# Patient Record
Sex: MALE | Race: WHITE | NOT HISPANIC OR LATINO | Employment: OTHER | URBAN - METROPOLITAN AREA
[De-identification: names, ages, dates, MRNs, and addresses within clinical notes are randomized per-mention and may not be internally consistent; named-entity substitution may affect disease eponyms.]

---

## 2022-11-29 ENCOUNTER — TELEPHONE (OUTPATIENT)
Dept: OTOLARYNGOLOGY | Facility: CLINIC | Age: 77
End: 2022-11-29
Payer: MEDICARE

## 2022-12-05 NOTE — TELEPHONE ENCOUNTER
12/2- Lilly spoke with pt and pt states Dr LUCIANA Galvez is ordering the U/S of thyroid to be done at [facility], and pt will call us once appt is scheduled.  KASSIE

## 2022-12-21 ENCOUNTER — TELEPHONE (OUTPATIENT)
Dept: OTOLARYNGOLOGY | Facility: CLINIC | Age: 77
End: 2022-12-21
Payer: MEDICARE

## 2022-12-21 DIAGNOSIS — E04.1 THYROID NODULE: Primary | ICD-10-CM

## 2022-12-21 NOTE — TELEPHONE ENCOUNTER
Ordered ultrasound of thyroid and called pt to inform him of his u/s on 12/27/22 @ [id number]. MAURIZIOj

## 2023-01-17 ENCOUNTER — OFFICE VISIT (OUTPATIENT)
Dept: OTOLARYNGOLOGY | Facility: CLINIC | Age: 78
End: 2023-01-17
Payer: MEDICARE

## 2023-01-17 VITALS
HEIGHT: 69 IN | SYSTOLIC BLOOD PRESSURE: 188 MMHG | BODY MASS INDEX: 32.44 KG/M2 | WEIGHT: 219 LBS | DIASTOLIC BLOOD PRESSURE: 97 MMHG | TEMPERATURE: 98 F | HEART RATE: 59 BPM

## 2023-01-17 DIAGNOSIS — E04.2 MULTINODULAR THYROID: ICD-10-CM

## 2023-01-17 PROCEDURE — 99213 OFFICE O/P EST LOW 20 MIN: CPT | Mod: S$PBB,ICN,, | Performed by: OTOLARYNGOLOGY

## 2023-01-17 PROCEDURE — 99213 OFFICE O/P EST LOW 20 MIN: CPT | Mod: PBBFAC | Performed by: OTOLARYNGOLOGY

## 2023-01-17 PROCEDURE — 99999 PR PBB SHADOW E&M-EST. PATIENT-LVL III: ICD-10-PCS | Mod: PBBFAC,,, | Performed by: OTOLARYNGOLOGY

## 2023-01-17 PROCEDURE — 99213 PR OFFICE/OUTPT VISIT, EST, LEVL III, 20-29 MIN: ICD-10-PCS | Mod: S$PBB,ICN,, | Performed by: OTOLARYNGOLOGY

## 2023-01-17 PROCEDURE — 99999 PR PBB SHADOW E&M-EST. PATIENT-LVL III: CPT | Mod: PBBFAC,,, | Performed by: OTOLARYNGOLOGY

## 2023-01-17 RX ORDER — BRIMONIDINE TARTRATE 1 MG/ML
1 SOLUTION/ DROPS OPHTHALMIC 2 TIMES DAILY
COMMUNITY
Start: 2023-01-13

## 2023-01-17 RX ORDER — ATORVASTATIN CALCIUM 20 MG/1
20 TABLET, FILM COATED ORAL NIGHTLY
COMMUNITY
Start: 2022-11-28

## 2023-01-17 RX ORDER — AMLODIPINE BESYLATE 10 MG/1
10 TABLET ORAL DAILY
COMMUNITY
Start: 2022-11-14

## 2023-01-17 NOTE — PROGRESS NOTES
Chief Complaint   Patient presents with    Follow-up     Follow up, Left Thyroid Nodule.         78 y.o. male presents for follow-up for his left thyroid nodule.  On review of the repeat imaging done at [place], the nodule is couple of mm smaller in 2 dimensions, and couple of mm larger in another dimension.  No internal characteristic changes of any significant note.          Past Medical History:   Diagnosis Date    Essential (primary) hypertension     Glaucoma     Hypercholesterolemia        Past Surgical History:   Procedure Laterality Date    KIDNEY SURGERY         family history is not on file.    Pt  reports that he quit smoking about 58 years ago. His smoking use included cigarettes. He has never used smokeless tobacco. He reports that he does not drink alcohol.    Review of patient's allergies indicates:  No Known Allergies     Physical Exam    Vitals:    01/17/23 1033   BP: (!) 188/97   Pulse: (!) 59   Temp: 98.1 °F (36.7 °C)     Body mass index is 32.34 kg/m².    General: AOx3, NAD  Right Ear: External Auditory Canal WNL,TM w/o masses/lesions/perforations  Left Ear:  External Auditory Canal WNL,TM w/o masses/lesions/perforations  Nose: No gross nasal septal deviation.  Inferior Turbinates WNL bilaterally.  No septal perforation.  No masses/lesions.  Oral Cavity: FOM Soft, no masses palpated.  Oral Tongue mobile.  Hard Palate WNL.  Oropharynx: BOT WNL.  No masses/lesions noted.  Tonsillar fossa without lesions.  Soft palate without masses.  Midline uvula.  Neck: No palpable lymphadenopathy at I - VI.  Fullness over the thyroid palpated  Face: House Brackmann I bilaterally.  Eyes: Normal extra ocular motion bilaterally.          Assessment     1. Multinodular thyroid          Plan  In summary, his nodule is stable to slightly smaller.  Given that, we will not repeat the biopsy.  They will repeat the ultrasound in 1 year.

## 2024-01-17 ENCOUNTER — TELEPHONE (OUTPATIENT)
Dept: SURGICAL ONCOLOGY | Facility: CLINIC | Age: 79
End: 2024-01-17
Payer: MEDICARE

## 2024-01-17 DIAGNOSIS — E04.2 MULTINODULAR THYROID: ICD-10-CM

## 2024-01-17 DIAGNOSIS — E04.1 LEFT THYROID NODULE: Primary | ICD-10-CM

## 2024-01-30 ENCOUNTER — OFFICE VISIT (OUTPATIENT)
Dept: SURGICAL ONCOLOGY | Facility: CLINIC | Age: 79
End: 2024-01-30
Payer: MEDICARE

## 2024-01-30 VITALS
WEIGHT: 216.38 LBS | BODY MASS INDEX: 33.96 KG/M2 | SYSTOLIC BLOOD PRESSURE: 150 MMHG | HEIGHT: 67 IN | DIASTOLIC BLOOD PRESSURE: 83 MMHG | HEART RATE: 61 BPM

## 2024-01-30 DIAGNOSIS — E04.2 MULTINODULAR THYROID: Primary | ICD-10-CM

## 2024-01-30 PROCEDURE — 99213 OFFICE O/P EST LOW 20 MIN: CPT | Mod: PBBFAC | Performed by: OTOLARYNGOLOGY

## 2024-01-30 PROCEDURE — 99214 OFFICE O/P EST MOD 30 MIN: CPT | Mod: S$PBB,,, | Performed by: OTOLARYNGOLOGY

## 2024-01-30 PROCEDURE — 99999 PR PBB SHADOW E&M-EST. PATIENT-LVL III: CPT | Mod: PBBFAC,,, | Performed by: OTOLARYNGOLOGY

## 2024-01-30 RX ORDER — CHOLECALCIFEROL (VITAMIN D3) 25 MCG
1000 TABLET ORAL DAILY
COMMUNITY

## 2024-01-30 NOTE — ASSESSMENT & PLAN NOTE
On the repeat imaging dated 01/22/2024, and compared to the 12/27/2022, there was no significant change of the 5.7 cm left lobe dominant nodule.    There are 2 new right-sided nodules that are subcentimeter.      We will repeat the ultrasound in 1 year and I will see him back after that.

## 2024-01-30 NOTE — PROGRESS NOTES
"[facility]     Patient ID: [id number]     Chief Complaint: 1 year US Follow up       HPI:     Tyson Bryson is a 79 y.o. male here today for repeat evaluation after his repeat ultrasound.  Ultimately the dominant 5.7 cm left-sided nodule has not changed in 2 years.  There are 2 new right-sided subcentimeter nodules that do not warrant biopsy today.  Overall he is feeling well.  No complaints.    Past Medical History:   Diagnosis Date    Essential (primary) hypertension     Glaucoma     Hypercholesterolemia         Past Surgical History:   Procedure Laterality Date    KIDNEY SURGERY          Social History     Tobacco Use    Smoking status: Former     Current packs/day: 0.00     Types: Cigarettes     Quit date: 1965     Years since quittin.1    Smokeless tobacco: Never   Substance and Sexual Activity    Alcohol use: Never    Drug use: Never    Sexual activity: Not on file        Current Outpatient Medications   Medication Instructions    ALPHAGAN P 0.1 % Drop 1 drop, Both Eyes, 2 times daily    amLODIPine (NORVASC) 10 mg, Oral, Daily    atorvastatin (LIPITOR) 20 mg, Oral, Nightly    vitamin D (VITAMIN D3) 1,000 Units, Oral, Daily       Review of patient's allergies indicates:  No Known Allergies     Patient Care Team:  Dino Cole Jr., MD as PCP - General (Internal Medicine)     Subjective:     Review of Systems    12 point review of systems conducted, negative except as stated in the history of present illness. See HPI for details.    Objective:     Visit Vitals  BP (!) 150/83   Pulse 61   Ht 5' 7" (1.702 m)   Wt 98.2 kg (216 lb 6.4 oz)   BMI 33.89 kg/m²       ENT Physical Exam     General: AOx3, NAD  Cardiac: Well perfused  Respiratory: Breathing without stridor or distress  Right Ear: External Auditory Canal WNL,TM w/o masses/lesions/perforations  Left Ear:  External Auditory Canal WNL,TM w/o masses/lesions/perforations  Nose: No gross nasal " septal deviation.  Inferior Turbinates WNL bilaterally.  No septal perforation.  No masses/lesions.  Oral Cavity: FOM Soft, no masses palpated.  Oral Tongue mobile.  Hard Palate WNL.  Oropharynx: BOT WNL.  No masses/lesions noted.  Tonsillar fossa without lesions.  Soft palate without masses.  Midline uvula.  Neck: No palpable lymphadenopathy at I - VI.    Face: House Brackmann I bilaterally.  Eyes: Normal extra ocular motion bilaterally.        Diagnostic Imaging:     All pertinent head and neck imaging independently reviewed. Discussed these findings in detail with the patient.    Assessment:       ICD-10-CM ICD-9-CM   1. Multinodular thyroid  E04.2 241.1        Plan:     1. Multinodular thyroid  Overview:  1. History of dominant left-sided thyroid nodule, 5.7 cm, previously biopsied in 2022 as benign    Assessment & Plan:  On the repeat imaging dated 01/22/2024, and compared to the 12/27/2022, there was no significant change of the 5.7 cm left lobe dominant nodule.    There are 2 new right-sided nodules that are subcentimeter.      We will repeat the ultrasound in 1 year and I will see him back after that.           No follow-ups on file. In addition to their scheduled follow up, the patient has also been instructed to follow up on as needed basis.     Future Appointments   Date Time Provider Department Center   1/28/2025  9:30 AM Dino Pearl Jr., MD [facility]        Dino Pearl Jr, MD

## 2024-12-06 ENCOUNTER — TELEPHONE (OUTPATIENT)
Dept: SURGICAL ONCOLOGY | Facility: CLINIC | Age: 79
End: 2024-12-06
Payer: MEDICARE